# Patient Record
Sex: MALE | Race: WHITE | Employment: FULL TIME | RURAL
[De-identification: names, ages, dates, MRNs, and addresses within clinical notes are randomized per-mention and may not be internally consistent; named-entity substitution may affect disease eponyms.]

---

## 2018-02-11 RX ORDER — OSELTAMIVIR PHOSPHATE 75 MG/1
75 CAPSULE ORAL DAILY
Qty: 10 CAPSULE | Refills: 0 | Status: SHIPPED | OUTPATIENT
Start: 2018-02-11 | End: 2018-02-21

## 2023-08-10 ENCOUNTER — APPOINTMENT (OUTPATIENT)
Dept: GENERAL RADIOLOGY | Facility: HOSPITAL | Age: 38
End: 2023-08-10
Attending: EMERGENCY MEDICINE
Payer: COMMERCIAL

## 2023-08-10 ENCOUNTER — HOSPITAL ENCOUNTER (OUTPATIENT)
Facility: HOSPITAL | Age: 38
Setting detail: OBSERVATION
Discharge: HOME | End: 2023-08-10
Attending: EMERGENCY MEDICINE | Admitting: INTERNAL MEDICINE
Payer: COMMERCIAL

## 2023-08-10 VITALS
HEART RATE: 66 BPM | RESPIRATION RATE: 18 BRPM | DIASTOLIC BLOOD PRESSURE: 69 MMHG | TEMPERATURE: 98.4 F | SYSTOLIC BLOOD PRESSURE: 118 MMHG | WEIGHT: 179.9 LBS | OXYGEN SATURATION: 94 %

## 2023-08-10 DIAGNOSIS — T78.40XA ALLERGIC REACTION, INITIAL ENCOUNTER: Primary | ICD-10-CM

## 2023-08-10 PROBLEM — T78.3XXA ANGIOEDEMA: Status: ACTIVE | Noted: 2023-08-10

## 2023-08-10 LAB
ALBUMIN SERPL-MCNC: 4.3 G/DL (ref 3.4–4.8)
ALBUMIN SERPL-MCNC: 4.3 G/DL (ref 3.4–4.8)
ALBUMIN/GLOB SERPL: 1.8 {RATIO} (ref 0.8–2)
ALBUMIN/GLOB SERPL: 1.9 {RATIO} (ref 0.8–2)
ALP SERPL-CCNC: 58 U/L (ref 25–100)
ALP SERPL-CCNC: 67 U/L (ref 25–100)
ALT SERPL-CCNC: 23 U/L (ref 4–36)
ALT SERPL-CCNC: 25 U/L (ref 4–36)
ANION GAP SERPL CALCULATED.3IONS-SCNC: 11 MMOL/L (ref 3–16)
ANION GAP SERPL CALCULATED.3IONS-SCNC: 16 MMOL/L (ref 3–16)
AST SERPL-CCNC: 22 U/L (ref 8–33)
AST SERPL-CCNC: 28 U/L (ref 8–33)
BASE EXCESS BLDA CALC-SCNC: -0.7 MMOL/L (ref -3–3)
BASOPHILS # BLD: 0.1 K/UL (ref 0–0.1)
BASOPHILS NFR BLD: 0.8 %
BILIRUB SERPL-MCNC: 0.3 MG/DL (ref 0.3–1.2)
BILIRUB SERPL-MCNC: 0.5 MG/DL (ref 0.3–1.2)
BUN SERPL-MCNC: 19 MG/DL (ref 6–20)
BUN SERPL-MCNC: 22 MG/DL (ref 6–20)
CALCIUM SERPL-MCNC: 9.1 MG/DL (ref 8.5–10.5)
CALCIUM SERPL-MCNC: 9.3 MG/DL (ref 8.5–10.5)
CHLORIDE SERPL-SCNC: 104 MMOL/L (ref 98–107)
CHLORIDE SERPL-SCNC: 107 MMOL/L (ref 98–107)
CO2 BLDA-SCNC: 25.1 MMOL/L (ref 24–30)
CO2 SERPL-SCNC: 20 MMOL/L (ref 20–30)
CO2 SERPL-SCNC: 23 MMOL/L (ref 20–30)
CREAT SERPL-MCNC: 1 MG/DL (ref 0.4–1.2)
CREAT SERPL-MCNC: 1.2 MG/DL (ref 0.4–1.2)
EOSINOPHIL # BLD: 0.1 K/UL (ref 0–0.4)
EOSINOPHIL NFR BLD: 1.4 %
ERYTHROCYTE [DISTWIDTH] IN BLOOD BY AUTOMATED COUNT: 12.7 % (ref 11–16)
ERYTHROCYTE [DISTWIDTH] IN BLOOD BY AUTOMATED COUNT: 12.8 % (ref 11–16)
ERYTHROCYTE [SEDIMENTATION RATE] IN BLOOD BY WESTERGREN METHOD: 6 MM/HR (ref 0–15)
GFR SERPLBLD CREATININE-BSD FMLA CKD-EPI: >60 ML/MIN/{1.73_M2}
GFR SERPLBLD CREATININE-BSD FMLA CKD-EPI: >60 ML/MIN/{1.73_M2}
GLOBULIN SER CALC-MCNC: 2.3 G/DL
GLOBULIN SER CALC-MCNC: 2.4 G/DL
GLUCOSE SERPL-MCNC: 133 MG/DL (ref 74–106)
GLUCOSE SERPL-MCNC: 162 MG/DL (ref 74–106)
HCO3 BLDA-SCNC: 23.9 MMOL/L (ref 22–26)
HCT VFR BLD AUTO: 49.1 % (ref 40–54)
HCT VFR BLD AUTO: 52.8 % (ref 40–54)
HGB BLD-MCNC: 16.4 G/DL (ref 13–18)
HGB BLD-MCNC: 17.7 G/DL (ref 13–18)
IMM GRANULOCYTES # BLD: 0 K/UL
IMM GRANULOCYTES NFR BLD: 0.4 % (ref 0–5)
INHALED O2 FLOW RATE: 0.36 %
LYMPHOCYTES # BLD: 4.5 K/UL (ref 1.5–4)
LYMPHOCYTES NFR BLD: 59.6 %
MCH RBC QN AUTO: 30.1 PG (ref 27–32)
MCH RBC QN AUTO: 30.2 PG (ref 27–32)
MCHC RBC AUTO-ENTMCNC: 33.4 G/DL (ref 31–35)
MCHC RBC AUTO-ENTMCNC: 33.5 G/DL (ref 31–35)
MCV RBC AUTO: 89.9 FL (ref 80–100)
MCV RBC AUTO: 90.3 FL (ref 80–100)
MONOCYTES # BLD: 0.7 K/UL (ref 0.2–0.8)
MONOCYTES NFR BLD: 9.4 %
NEUTROPHILS # BLD: 2.2 K/UL (ref 2–7.5)
NEUTS SEG NFR BLD: 28.4 %
O2 THERAPY: ABNORMAL
PCO2 BLDA: 39.3 MMHG (ref 35–45)
PH BLDA: 7.4 [PH] (ref 7.35–7.45)
PLATELET # BLD AUTO: 239 K/UL (ref 150–400)
PLATELET # BLD AUTO: 314 K/UL (ref 150–400)
PMV BLD AUTO: 8.5 FL (ref 6–10)
PMV BLD AUTO: 8.6 FL (ref 6–10)
PO2 BLDA: 73 MMHG (ref 80–100)
POTASSIUM SERPL-SCNC: 4.6 MMOL/L (ref 3.4–5.1)
POTASSIUM SERPL-SCNC: 4.7 MMOL/L (ref 3.4–5.1)
PROT SERPL-MCNC: 6.6 G/DL (ref 6.4–8.3)
PROT SERPL-MCNC: 6.7 G/DL (ref 6.4–8.3)
RBC # BLD AUTO: 5.44 M/UL (ref 4.5–6)
RBC # BLD AUTO: 5.87 M/UL (ref 4.5–6)
SAO2 % BLDA: 94.2 %
SODIUM SERPL-SCNC: 138 MMOL/L (ref 136–145)
SODIUM SERPL-SCNC: 143 MMOL/L (ref 136–145)
WBC # BLD AUTO: 17.1 K/UL (ref 4–11)
WBC # BLD AUTO: 7.6 K/UL (ref 4–11)

## 2023-08-10 PROCEDURE — 71045 X-RAY EXAM CHEST 1 VIEW: CPT

## 2023-08-10 PROCEDURE — 6360000002 HC RX W HCPCS

## 2023-08-10 PROCEDURE — 2580000003 HC RX 258: Performed by: EMERGENCY MEDICINE

## 2023-08-10 PROCEDURE — 36600 WITHDRAWAL OF ARTERIAL BLOOD: CPT

## 2023-08-10 PROCEDURE — 6360000002 HC RX W HCPCS: Performed by: PHYSICIAN ASSISTANT

## 2023-08-10 PROCEDURE — 85652 RBC SED RATE AUTOMATED: CPT

## 2023-08-10 PROCEDURE — G0378 HOSPITAL OBSERVATION PER HR: HCPCS

## 2023-08-10 PROCEDURE — 36415 COLL VENOUS BLD VENIPUNCTURE: CPT

## 2023-08-10 PROCEDURE — 80053 COMPREHEN METABOLIC PANEL: CPT

## 2023-08-10 PROCEDURE — 6370000000 HC RX 637 (ALT 250 FOR IP): Performed by: PHYSICIAN ASSISTANT

## 2023-08-10 PROCEDURE — 96375 TX/PRO/DX INJ NEW DRUG ADDON: CPT

## 2023-08-10 PROCEDURE — A4216 STERILE WATER/SALINE, 10 ML: HCPCS | Performed by: EMERGENCY MEDICINE

## 2023-08-10 PROCEDURE — 99235 HOSP IP/OBS SAME DATE MOD 70: CPT | Performed by: INTERNAL MEDICINE

## 2023-08-10 PROCEDURE — 6360000002 HC RX W HCPCS: Performed by: EMERGENCY MEDICINE

## 2023-08-10 PROCEDURE — 96374 THER/PROPH/DIAG INJ IV PUSH: CPT

## 2023-08-10 PROCEDURE — 2500000003 HC RX 250 WO HCPCS: Performed by: EMERGENCY MEDICINE

## 2023-08-10 PROCEDURE — 96372 THER/PROPH/DIAG INJ SC/IM: CPT

## 2023-08-10 PROCEDURE — 85027 COMPLETE CBC AUTOMATED: CPT

## 2023-08-10 PROCEDURE — 85025 COMPLETE CBC W/AUTO DIFF WBC: CPT

## 2023-08-10 PROCEDURE — 70360 X-RAY EXAM OF NECK: CPT

## 2023-08-10 PROCEDURE — 99285 EMERGENCY DEPT VISIT HI MDM: CPT

## 2023-08-10 PROCEDURE — 2580000003 HC RX 258

## 2023-08-10 PROCEDURE — 82803 BLOOD GASES ANY COMBINATION: CPT

## 2023-08-10 PROCEDURE — 2580000003 HC RX 258: Performed by: PHYSICIAN ASSISTANT

## 2023-08-10 RX ORDER — METHYLPREDNISOLONE SODIUM SUCCINATE 125 MG/2ML
125 INJECTION, POWDER, LYOPHILIZED, FOR SOLUTION INTRAMUSCULAR; INTRAVENOUS ONCE
Status: COMPLETED | OUTPATIENT
Start: 2023-08-10 | End: 2023-08-10

## 2023-08-10 RX ORDER — FAMOTIDINE 20 MG/1
20 TABLET, FILM COATED ORAL DAILY
Qty: 30 TABLET | Refills: 0 | Status: SHIPPED | OUTPATIENT
Start: 2023-08-10

## 2023-08-10 RX ORDER — CETIRIZINE HYDROCHLORIDE 10 MG/1
10 TABLET ORAL DAILY
Status: DISCONTINUED | OUTPATIENT
Start: 2023-08-10 | End: 2023-08-10 | Stop reason: HOSPADM

## 2023-08-10 RX ORDER — DIPHENHYDRAMINE HCL 25 MG
25 TABLET ORAL EVERY 6 HOURS PRN
Qty: 120 TABLET | Refills: 0 | Status: SHIPPED | OUTPATIENT
Start: 2023-08-10 | End: 2023-09-09

## 2023-08-10 RX ORDER — ACETAMINOPHEN 325 MG/1
650 TABLET ORAL EVERY 6 HOURS PRN
Status: DISCONTINUED | OUTPATIENT
Start: 2023-08-10 | End: 2023-08-10 | Stop reason: HOSPADM

## 2023-08-10 RX ORDER — CETIRIZINE HYDROCHLORIDE 10 MG/1
10 TABLET ORAL DAILY
Qty: 30 TABLET | Refills: 0 | Status: SHIPPED | OUTPATIENT
Start: 2023-08-11 | End: 2023-09-10

## 2023-08-10 RX ORDER — POLYETHYLENE GLYCOL 3350 17 G/17G
17 POWDER, FOR SOLUTION ORAL DAILY PRN
Status: DISCONTINUED | OUTPATIENT
Start: 2023-08-10 | End: 2023-08-10 | Stop reason: HOSPADM

## 2023-08-10 RX ORDER — ONDANSETRON 4 MG/1
4 TABLET, ORALLY DISINTEGRATING ORAL EVERY 8 HOURS PRN
Status: DISCONTINUED | OUTPATIENT
Start: 2023-08-10 | End: 2023-08-10 | Stop reason: HOSPADM

## 2023-08-10 RX ORDER — ONDANSETRON 2 MG/ML
4 INJECTION INTRAMUSCULAR; INTRAVENOUS ONCE
Status: COMPLETED | OUTPATIENT
Start: 2023-08-10 | End: 2023-08-10

## 2023-08-10 RX ORDER — ONDANSETRON 2 MG/ML
4 INJECTION INTRAMUSCULAR; INTRAVENOUS EVERY 6 HOURS PRN
Status: DISCONTINUED | OUTPATIENT
Start: 2023-08-10 | End: 2023-08-10 | Stop reason: HOSPADM

## 2023-08-10 RX ORDER — DIPHENHYDRAMINE HCL 25 MG
25 TABLET ORAL EVERY 6 HOURS
Status: DISCONTINUED | OUTPATIENT
Start: 2023-08-10 | End: 2023-08-10 | Stop reason: HOSPADM

## 2023-08-10 RX ORDER — PREDNISONE 10 MG/1
30 TABLET ORAL DAILY
Qty: 9 TABLET | Refills: 0 | Status: SHIPPED | OUTPATIENT
Start: 2023-08-10 | End: 2023-08-13

## 2023-08-10 RX ORDER — METHYLPREDNISOLONE SODIUM SUCCINATE 125 MG/2ML
INJECTION, POWDER, LYOPHILIZED, FOR SOLUTION INTRAMUSCULAR; INTRAVENOUS
Status: COMPLETED
Start: 2023-08-10 | End: 2023-08-10

## 2023-08-10 RX ORDER — METHYLPREDNISOLONE SODIUM SUCCINATE 40 MG/ML
40 INJECTION, POWDER, LYOPHILIZED, FOR SOLUTION INTRAMUSCULAR; INTRAVENOUS EVERY 12 HOURS
Status: DISCONTINUED | OUTPATIENT
Start: 2023-08-10 | End: 2023-08-10 | Stop reason: HOSPADM

## 2023-08-10 RX ORDER — EPINEPHRINE 1 MG/ML
INJECTION, SOLUTION INTRAMUSCULAR; SUBCUTANEOUS
Status: COMPLETED
Start: 2023-08-10 | End: 2023-08-10

## 2023-08-10 RX ORDER — SODIUM CHLORIDE 9 MG/ML
INJECTION, SOLUTION INTRAVENOUS CONTINUOUS
Status: DISCONTINUED | OUTPATIENT
Start: 2023-08-10 | End: 2023-08-10 | Stop reason: HOSPADM

## 2023-08-10 RX ORDER — WATER 1000 ML/1000ML
INJECTION, SOLUTION INTRAVENOUS
Status: COMPLETED
Start: 2023-08-10 | End: 2023-08-10

## 2023-08-10 RX ORDER — ONDANSETRON 2 MG/ML
INJECTION INTRAMUSCULAR; INTRAVENOUS
Status: COMPLETED
Start: 2023-08-10 | End: 2023-08-10

## 2023-08-10 RX ORDER — DIPHENHYDRAMINE HYDROCHLORIDE 50 MG/ML
25 INJECTION INTRAMUSCULAR; INTRAVENOUS ONCE
Status: DISCONTINUED | OUTPATIENT
Start: 2023-08-10 | End: 2023-08-10

## 2023-08-10 RX ORDER — EPINEPHRINE 0.3 MG/.3ML
0.3 INJECTION SUBCUTANEOUS ONCE
Qty: 0.3 ML | Refills: 0 | Status: SHIPPED | OUTPATIENT
Start: 2023-08-10 | End: 2023-08-10

## 2023-08-10 RX ORDER — DIPHENHYDRAMINE HYDROCHLORIDE 50 MG/ML
12.5 INJECTION INTRAMUSCULAR; INTRAVENOUS ONCE
Status: COMPLETED | OUTPATIENT
Start: 2023-08-10 | End: 2023-08-10

## 2023-08-10 RX ORDER — ACETAMINOPHEN 650 MG/1
650 SUPPOSITORY RECTAL EVERY 6 HOURS PRN
Status: DISCONTINUED | OUTPATIENT
Start: 2023-08-10 | End: 2023-08-10 | Stop reason: HOSPADM

## 2023-08-10 RX ORDER — ENOXAPARIN SODIUM 100 MG/ML
40 INJECTION SUBCUTANEOUS DAILY
Status: DISCONTINUED | OUTPATIENT
Start: 2023-08-10 | End: 2023-08-10 | Stop reason: HOSPADM

## 2023-08-10 RX ORDER — EPINEPHRINE 1 MG/ML
0.3 INJECTION, SOLUTION INTRAMUSCULAR; SUBCUTANEOUS ONCE
Status: COMPLETED | OUTPATIENT
Start: 2023-08-10 | End: 2023-08-10

## 2023-08-10 RX ORDER — SODIUM CHLORIDE, SODIUM LACTATE, POTASSIUM CHLORIDE, AND CALCIUM CHLORIDE .6; .31; .03; .02 G/100ML; G/100ML; G/100ML; G/100ML
1000 INJECTION, SOLUTION INTRAVENOUS ONCE
Status: COMPLETED | OUTPATIENT
Start: 2023-08-10 | End: 2023-08-10

## 2023-08-10 RX ORDER — FAMOTIDINE 20 MG/1
20 TABLET, FILM COATED ORAL 2 TIMES DAILY
Status: DISCONTINUED | OUTPATIENT
Start: 2023-08-10 | End: 2023-08-10 | Stop reason: HOSPADM

## 2023-08-10 RX ADMIN — DIPHENHYDRAMINE HYDROCHLORIDE 25 MG: 25 TABLET ORAL at 09:34

## 2023-08-10 RX ADMIN — EPINEPHRINE 0.3 MG: 1 INJECTION INTRAMUSCULAR; INTRAVENOUS; SUBCUTANEOUS at 02:41

## 2023-08-10 RX ADMIN — FAMOTIDINE 20 MG: 20 TABLET ORAL at 08:13

## 2023-08-10 RX ADMIN — DIPHENHYDRAMINE HYDROCHLORIDE 12.5 MG: 50 INJECTION INTRAMUSCULAR; INTRAVENOUS at 02:02

## 2023-08-10 RX ADMIN — METHYLPREDNISOLONE SODIUM SUCCINATE 125 MG: 125 INJECTION, POWDER, LYOPHILIZED, FOR SOLUTION INTRAMUSCULAR; INTRAVENOUS at 00:13

## 2023-08-10 RX ADMIN — ENOXAPARIN SODIUM 40 MG: 100 INJECTION SUBCUTANEOUS at 08:14

## 2023-08-10 RX ADMIN — FAMOTIDINE 40 MG: 10 INJECTION INTRAVENOUS at 00:28

## 2023-08-10 RX ADMIN — CETIRIZINE HYDROCHLORIDE 10 MG: 10 TABLET, FILM COATED ORAL at 08:13

## 2023-08-10 RX ADMIN — ONDANSETRON 4 MG: 2 INJECTION INTRAMUSCULAR; INTRAVENOUS at 00:13

## 2023-08-10 RX ADMIN — METHYLPREDNISOLONE SODIUM SUCCINATE 125 MG: 125 INJECTION INTRAMUSCULAR; INTRAVENOUS at 00:13

## 2023-08-10 RX ADMIN — EPINEPHRINE 0.3 MG: 1 INJECTION, SOLUTION INTRAMUSCULAR; SUBCUTANEOUS at 02:41

## 2023-08-10 RX ADMIN — SODIUM CHLORIDE: 9 INJECTION, SOLUTION INTRAVENOUS at 04:03

## 2023-08-10 RX ADMIN — DIPHENHYDRAMINE HYDROCHLORIDE 25 MG: 25 TABLET ORAL at 03:55

## 2023-08-10 RX ADMIN — SODIUM CHLORIDE, POTASSIUM CHLORIDE, SODIUM LACTATE AND CALCIUM CHLORIDE 1000 ML: 600; 310; 30; 20 INJECTION, SOLUTION INTRAVENOUS at 01:33

## 2023-08-10 RX ADMIN — WATER: 1 INJECTION INTRAMUSCULAR; INTRAVENOUS; SUBCUTANEOUS at 00:13

## 2023-08-10 NOTE — PROGRESS NOTES
Pt arrived to floor from ED vis wheel chair. Patient alert and oriented. Ambulates independently. Vitals taken and recorded. Head to toe assessment done. Appropriate meds administered. Patient lying in bed without any S/S of distress noted at this time. Call light with in reach.

## 2023-08-10 NOTE — ED NOTES
Widespread hives noted head to toe. Swelling to lips and bilateral eyes. Pt denies SOA, only complaints of throat feeling \"a little\" swollen. Pt able to speak without difficulty. Wife states at home before she could get pt in the car he passed out in the driveway.  Pt does not remember this happening and states he was \"in and out\" on the way to the hospital.      Connie Peterson RN  08/10/23 2120

## 2023-08-10 NOTE — FLOWSHEET NOTE
08/10/23 0115   Oxygen Therapy   SpO2 94 %   Pulse via Oximetry 80 beats per minute   O2 Device None (Room air)     Pt placed back on room air. O2 sat 90-95%.

## 2023-08-10 NOTE — PROGRESS NOTES
PIV and telemetry discontinued. Discharge home. Pt verbalized understanding of discharge instructions and received home medications from pharmacy, including epi pen. Accompanied to exit.

## 2023-08-10 NOTE — PROGRESS NOTES
CLINICAL PHARMACY NOTE: MEDS TO BEDS    Total # of Prescriptions Filled: 5   The following medications were delivered to the patient:  Discharge Medication List as of 8/10/2023  9:54 AM        START taking these medications    Details   cetirizine (ZYRTEC) 10 MG tablet Take 1 tablet by mouth daily, Disp-30 tablet, R-0Normal      diphenhydrAMINE (BENADRYL) 25 MG tablet Take 1 tablet by mouth every 6 hours as needed for Itching, Disp-120 tablet, R-0Normal      famotidine (PEPCID) 20 MG tablet Take 1 tablet by mouth daily, Disp-30 tablet, R-0Normal      predniSONE (DELTASONE) 10 MG tablet Take 3 tablets by mouth daily for 3 days, Disp-9 tablet, R-0Normal         Epinephrine 0.3gm/0.3ml SOAJ  Inject one pen into the middle of the outer thigh and hold for 3 seconds as needed for severe allergic reaction. Then call 911 if used. Qty: 2 pens Refill: 0    Additional Documentation:  Medications were delivered to patient's bedside and signed for by patient.

## 2023-08-10 NOTE — ED TRIAGE NOTES
Pt arrives to ED POV with severe allergic reaction. Wife came to registration to alert staff, on arrival to car pt was laid back in passenger seat. Minimally responsive. Was able to arouse pt and he stood to get in wheelchair. Pt hands visibly swollen. On arrival to ED room pt face cyanotic, pt will not respond to questions or commands. Pt began to fall face first out of wheelchair, caught by staff, pt remains unresponsive but awake. Pt with emesis while in wheelchair, after episode of emesis pt turned a pale color and did respond to name. Pt able to stand up to get in bed.

## 2023-08-10 NOTE — PLAN OF CARE
Problem: Discharge Planning  Goal: Discharge to home or other facility with appropriate resources  8/10/2023 0950 by Kelsey Mortimer, RN  Outcome: Completed  8/10/2023 0950 by Kelsey Mortimer, RN  Outcome: Progressing  8/10/2023 0436 by Heather Garduno RN  Outcome: Progressing     Problem: Safety - Adult  Goal: Free from fall injury  8/10/2023 0950 by Kelsey Mortimer, RN  Outcome: Completed  8/10/2023 0436 by Heather Garduno RN  Outcome: Progressing

## 2023-08-10 NOTE — FLOWSHEET NOTE
08/10/23 0021   Oxygen Therapy   SpO2 (!) 86 %   Pulse via Oximetry 91 beats per minute   O2 Device None (Room air)     O2 sat 86% on room air, pt placed on 2 L NC, O2 sat increased to 90%.

## 2023-08-10 NOTE — DISCHARGE SUMMARY
Short Stay Summary      Patient ID: Tabitha Abreu      Patient's PCP: No primary care provider on file. Admit Date: 8/10/2023     Discharge Date:   8/10/2023    Admitting Physician: Kalyan Nguyen MD    Discharge Physician: SHADI Richey     Reason for this admission:   Allergic reaction  Angioedema    Discharge Diagnoses: Active Hospital Problems    Diagnosis Date Noted    Allergic reaction, initial encounter Chapincito Nobles 08/10/2023    Angioedema [T78. 3XXA] 08/10/2023       Procedures:  XR NECK SOFT TISSUE   Final Result      No soft tissue abnormality is identified on standard radiograph      Electronically signed by Bhakti Stevens MD      XR CHEST PORTABLE   Final Result      No acute pulmonary pathology            Electronically signed by Bhakti Stevens MD            Consults:   IP CONSULT TO CASE MANAGEMENT      HISTORY OF PRESENT ILLNESS:   The patient is a 40 y.o. male with no significant PMH who presented to the emergency department with complaints of an allergic reaction. Patient states after going to bed, he awoke and flushed, itchy and noted hives. He also felt like his lips were swollen and as if his throat was somewhat swollen. Patient denied any SOA or dyspnea. He proceeded to take Benadryl with no significant improvement. Patient reports history of allergic reactions in the past that are typically responsive to Benadryl. He is unsure what prompted this allergic reaction. Patient continued to feel anxious and as if his lips and throat were still swollen. Wife convinced patient to proceed to the hospital for evaluation. Wife reported on the way to the hospital, patient was \"in and out of consciousness. \"  According to the ER physician documentation, ER nurses noted when they pulled patient from the car, they had to drive the patient out of the car. He appeared cyanotic.   Upon presentation to the ER, he proceeded to vomit and appeared to pass out but when he was repositioned on the 08/10/2023     08/10/2023       Lab Results   Component Value Date     08/10/2023    K 4.6 08/10/2023     08/10/2023    CO2 23 08/10/2023    BUN 19 08/10/2023    CREATININE 1.0 08/10/2023    GLUCOSE 133 (H) 08/10/2023    CALCIUM 9.1 08/10/2023    PROT 6.6 08/10/2023    LABALBU 4.3 08/10/2023    BILITOT 0.5 08/10/2023    ALKPHOS 58 08/10/2023    AST 22 08/10/2023    ALT 25 08/10/2023    LABGLOM >60 08/10/2023    AGRATIO 1.9 08/10/2023    GLOB 2.3 08/10/2023           PA/lat CXR:   XR NECK SOFT TISSUE   Final Result      No soft tissue abnormality is identified on standard radiograph      Electronically signed by Tracey Betts MD      XR CHEST PORTABLE   Final Result      No acute pulmonary pathology            Electronically signed by Tracey Betts MD            EKG: None    Assessment and Plan/Hospital course: Active Hospital Problems    Diagnosis Date Noted    Allergic reaction, initial encounter Dulce Garcia 08/10/2023    Angioedema [T78. 3XXA] 08/10/2023     Patient was monitored with continuous pulse ox and telemetry on the medical floor overnight. He remained stable. Patient has no evidence of allergic reaction this morning. Long discussion with patient regarding follow-up with allergy and immunology for allergy testing. Patient is agreeable. Plan to discharge patient home with EpiPen. He will also be prescribed Zyrtec, Pepcid and prednisone (for 3 days). He has been given a as needed supplies of Benadryl.     Disposition: home    Discharged Condition: Stable    Activity: activity as tolerated  Diet: regular diet  Follow Up: Primary Care Physician in one week with Allergy and Immunology as scheduled      Discharge Medications:     Current Discharge Medication List             Details   cetirizine (ZYRTEC) 10 MG tablet Take 1 tablet by mouth daily  Qty: 30 tablet, Refills: 0      diphenhydrAMINE (BENADRYL) 25 MG tablet Take 1 tablet by mouth every 6 hours as needed for

## 2023-08-10 NOTE — H&P
Short Stay Summary      Patient ID: Kimberlee Bedolla      Patient's PCP: No primary care provider on file. Admit Date: 8/10/2023     Discharge Date:   8/10/2023    Admitting Physician: Laureano Mathew MD    Discharge Physician: SHADI Donald     Reason for this admission:   Allergic reaction  Angioedema    Discharge Diagnoses: Active Hospital Problems    Diagnosis Date Noted    Allergic reaction, initial encounter Cory Ram 08/10/2023    Angioedema [T78. 3XXA] 08/10/2023       Procedures:  XR NECK SOFT TISSUE   Final Result      No soft tissue abnormality is identified on standard radiograph      Electronically signed by Carmen Mae MD      XR CHEST PORTABLE   Final Result      No acute pulmonary pathology            Electronically signed by Carmen Mae MD            Consults:   IP CONSULT TO CASE MANAGEMENT      HISTORY OF PRESENT ILLNESS:   The patient is a 40 y.o. male with no significant PMH who presented to the emergency department with complaints of an allergic reaction. Patient states after going to bed, he awoke and flushed, itchy and noted hives. He also felt like his lips were swollen and as if his throat was somewhat swollen. Patient denied any SOA or dyspnea. He proceeded to take Benadryl with no significant improvement. Patient reports history of allergic reactions in the past that are typically responsive to Benadryl. He is unsure what prompted this allergic reaction. Patient continued to feel anxious and as if his lips and throat were still swollen. Wife convinced patient to proceed to the hospital for evaluation. Wife reported on the way to the hospital, patient was \"in and out of consciousness. \"  According to the ER physician documentation, ER nurses noted when they pulled patient from the car, they had to drive the patient out of the car. He appeared cyanotic.   Upon presentation to the ER, he proceeded to vomit and appeared to pass out but when he was repositioned on the

## 2023-08-10 NOTE — FLOWSHEET NOTE
08/10/23 0025   Oxygen Therapy   SpO2 (!) 88 %   Pulse via Oximetry 85 beats per minute   O2 Device Nasal cannula   O2 Flow Rate (L/min) 2 L/min     O2 sat 88% on 2 L NC, increased to 4 L NC and O2 sat increased to 99%. Dr. Yao Fuentes aware.

## 2023-08-10 NOTE — ED NOTES
Pt resting. Family at bedside. No blotches noted but feels like throat is still swollen.  Pt talking and breathing without problems     Pancho Dominguez RN  08/10/23 0544 [phone]

## 2023-08-10 NOTE — ED PROVIDER NOTES
Noelle Molina [facility] [facility] ENCOUNTER        Pt Name: James Gordon  MRN: [id number]  [id number] 1985  Date of evaluation: 8/10/2023  Provider: Juan Ames MD  PCP: No primary care provider on file. Note Started: 12:22 AM EDT 8/10/23    CHIEF COMPLAINT       Chief Complaint   Patient presents with    Allergic Reaction       HISTORY OF PRESENT ILLNESS: 1 or more Elements     History from : Patient    Limitations to history : Timeframe of in and out of consciousness    James Gordon is a 40 y.o. male who presents brought in by his wife he apparently went to bed earlier this evening and had woken up with flushed feeling itching had developed hives felt his lips were swollen felt as if his throat was somewhat swollen but he could breathe fine according to him he proceeded to take some Benadryl 37.5 mg from what his wife gave him and said that he has had these episodes before and that this is always done the trick however this time apparently he continued to feel anxious and like his lips and throat were still swollen and was pacing around when his wife decided that he needed to come to the hospital.  She states that on the way to the hospital he was \"in and out of consciousness. According to the nurses when they pulled up in the car they had to drag the patient out of the car and he was cyanotic in appearance. Upon getting to the ER room he proceeded to vomit and appeared to pass out but when we got him back upright and onto the stretcher he was able to talk to me give me history cooperate with exam.    Nursing Notes were all reviewed and agreed with or any disagreements were addressed in the HPI. REVIEW OF SYSTEMS :      Review of Systems     systems reviewed and negative except as in HPI/MDM    SURGICAL HISTORY   History reviewed. No pertinent surgical history.     CURRENTMEDICATIONS       Previous Medications    No medications on file       ALLERGIES explanation for abnormal labs/vitals that do not relate to sepsis, see MDM for further explanation    PAST MEDICAL HISTORY      has no past medical history on file. CC/HPI Summary, DDx, ED Course, and Reassessment: [id number]    Patient got better after Solu-Medrol and Pepcid were given via IV however he continued to have slight swelling of his lip and complaining of still the feeling of some swelling in his throat. His blood gas was good and he continued to improve his respiratory status but his O2 sats going up to 97% on room air with no further intervention. Chest x-ray was clear and x-ray of his soft tissue of the neck was eventually done to look for any obvious edema or obstruction and it was also negative. Because of the continued complaint of throat swelling and lip swelling patient was then given more Benadryl and epinephrine. Patient says his throat does feel somewhat better since this medication lip is still slightly swollen but patient has remained stable. Therefore we will go ahead and admit him for observation given the need for epinephrine and the in this reaction. CONSULTS: (Who and What was discussed)  None    Discussion with Other Profesionals : Admitting Team [facility] who agreed to admit patient for observation        Chronic Conditions:         Disposition Considerations (include 1 Tests not done, Shared Decision Making, Pt Expectation of Test or Tx.):         I am the Primary Clinician of Record. FINAL IMPRESSION      1. Allergic reaction, initial encounter          DISPOSITION/PLAN     DISPOSITION Decision To Admit 08/10/2023 02:47:31 AM  admit for observation    PATIENT REFERRED TO:  No follow-up provider specified.     DISCHARGE MEDICATIONS:  New Prescriptions    No medications on file       DISCONTINUED MEDICATIONS:  Discontinued Medications    No medications on file              (Please note that portions of this note were completed with a voice recognition program.  Efforts were